# Patient Record
Sex: MALE | Race: WHITE | NOT HISPANIC OR LATINO
[De-identification: names, ages, dates, MRNs, and addresses within clinical notes are randomized per-mention and may not be internally consistent; named-entity substitution may affect disease eponyms.]

---
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
## 2022-02-05 ENCOUNTER — OFFICE VISIT (OUTPATIENT)
Dept: FAMILY MEDICINE | Facility: CLINIC | Age: 22
End: 2022-02-05
Payer: COMMERCIAL

## 2022-02-05 VITALS
TEMPERATURE: 98.2 F | WEIGHT: 172 LBS | SYSTOLIC BLOOD PRESSURE: 133 MMHG | OXYGEN SATURATION: 98 % | BODY MASS INDEX: 22.08 KG/M2 | RESPIRATION RATE: 16 BRPM | HEART RATE: 107 BPM | DIASTOLIC BLOOD PRESSURE: 78 MMHG

## 2022-02-05 DIAGNOSIS — B08.4 HAND, FOOT AND MOUTH DISEASE: Primary | ICD-10-CM

## 2022-02-05 PROCEDURE — 99213 OFFICE O/P EST LOW 20 MIN: CPT | Performed by: PHYSICIAN ASSISTANT

## 2022-02-05 NOTE — PROGRESS NOTES
Assessment & Plan:      Problem List Items Addressed This Visit     None      Visit Diagnoses     Hand, foot and mouth disease    -  Primary        Medical Decision Making  Patient presents with acute onset sores/ulcers of the gums, tongue, and throat. Symptoms appear most consistent with hand, foot and mouth disease versus herpangina. Patient is otherwise tolerating fluids appropriately and has no fevers. No signs of respiratory distress. Provided education materials and discussed expected course of illness. Continue with plenty of fluids, rest, salt water gargles, honey, and over-the-counter analgesics as needed.  Discussed signs of worsening symptoms and when to follow-up with PCP if no symptom improvement.     Subjective:      Eliud Henao is a 21 year old male here for evaluation of throat pain and throat sores. Onset of symptoms was 3 to 4 days ago. Patient also notes on and off throat discomfort and sinus congestion over the last several months. Associated symptoms include fatigue. Patient denies fevers, cough, and shortness of breath. Patient has been using over-the-counter analgesics and salt water gargles with some mild improvement of symptoms. No other rash on the hands of the feet.     The following portions of the patient's history were reviewed and updated as appropriate: allergies, current medications, and problem list.     Review of Systems  Pertinent items are noted in HPI.    Allergies  No Known Allergies    Family History   Problem Relation Age of Onset     Diabetes Sister      Insulin Resistance Brother      Hypertension Mother        Social History     Tobacco Use     Smoking status: Never Smoker     Smokeless tobacco: Never Used   Substance Use Topics     Alcohol use: Not on file        Objective:      /78   Pulse 107   Temp 98.2  F (36.8  C)   Resp 16   Wt 78 kg (172 lb)   SpO2 98%   BMI 22.08 kg/m    General appearance - alert, well appearing, and in no distress and  non-toxic  Mouth - Several nonraised ulcerations affecting the gums, tongue, and posterior oropharynx, no exudate, no tonsillar swelling, mucous membranes moist, lips normal    The use of Dragon/Finestrella dictation services was used to construct the content of this note; any grammatical errors are non-intentional. Please contact the author directly if you are in need of any clarification.

## 2022-02-05 NOTE — PATIENT INSTRUCTIONS
Patient Education     When Your Child Has Hand, Foot, and Mouth Disease   Hand, foot, and mouth disease (HFMD) is a common viral infection in children. It can cause mouth sores and a painless rash on the hands, feet, or buttocks. HFMD can be easily spread from 1 person to another. It occurs more often in children younger than 10 years old. But anyone can get it. HFMD is often mistaken for strep throat because the symptoms of both conditions are similar. HFMD can cause some discomfort, but it s not a serious problem. Most cases can easily be managed and treated at home.   What causes hand, foot, and mouth disease?  HFMD is usually caused by the coxsackievirus. It can also be caused by other viruses in the same family as coxsackievirus. Your child may have caught HFMD in 1 of these ways:     Breathing infected air. The virus can enter the air when an infected person coughs, sneezes, or talks.    Contact with contaminated items. Some things may have traces of stool from an infected person. This can occur when an infected person doesn t wash his or her hands after having a bowel movement or changing a diaper.    Contact with fluid from the blisters. The blisters are part of the rash. This type of transmission is rare.  What are the symptoms of hand, foot, and mouth disease?  Symptoms usually appear 24 to 72 hours after contact. They include:     Rash of small, red bumps or blisters on the hands, feet, or buttocks    Mouth sores that often occur on the gums, tongue, inside the cheeks, and in the back of the throat (mouth sores may not occur in some children)    Sore throat    A rash over the rest of the body    Fever    Loss of appetite    Pain when swallowing    Drooling  How is hand, foot, and mouth disease diagnosed?  HFMD is diagnosed by how the rash and mouth sores look. The healthcare provider will ask about your child s symptoms and health history. He or she will also examine your child. You will be told if any  tests are needed. These are done to rule out other infections.   How is hand, foot, and mouth disease treated?  There is no specific treatment for HFMD. But there are things you can do at home to help relieve some symptoms. The illness lasts about 7 to 10 days. Your child is no longer contagious 24 hours after the fever is gone.   Mouth pain    Give your child ibuprofen or acetaminophen to treat pain or discomfort. Or, use the medicine prescribed by the healthcare provider for pain. Talk with your child's provider about the dose and when to give the medicine (schedule). Do not give ibuprofen to a baby age 6 months or younger. Do not give aspirin to a child with a fever. This can put your child at risk of a serious illness called Reye syndrome.    Liquid antacid can be used 4 times per day. This is used to coat the mouth sores for pain relief. Talk with your child's provider about how much and when to give the medicine to your child:  ? Children over age 4 can use 1 teaspoon (5ml) as a mouth rinse after meals.  ? For children under age 4, a parent can place 1/2 teaspoon (2.5ml) in the front of the mouth after meals. Don't use regular mouth rinses. They may sting.  Diet    Follow a soft diet with plenty of fluids to prevent too much fluid loss (dehydration). If your child doesn't want to eat solid foods, it's OK for a few days, as long as he or she drinks plenty of fluids.    Give your child cool drinks and frozen treats such as sherbet. These are soothing and easier to take.    Don't give your child citrus juices such as orange juice or lemonade. Don't give your child salty or spicy foods. These may cause more pain in the mouth sores.    When to get medical care  Call the child's provider if your child has any of these:     A mouth sore that doesn t go away within  14 days    Increased mouth pain    Trouble swallowing    Neck pain    Chest pain    Trouble breathing    Weakness    Lack of energy    Signs of infection  around the rash or mouth sores, such as pus, fluid leaking, or swelling)    Signs of too much fluid loss, such as very dark or little urine, excessive thirst, dry mouth, dizziness    A fever (see Fever and children below)    A seizure  Fever and children  Use a digital thermometer to check your child s temperature. Don t use a mercury thermometer. There are different kinds and uses of digital thermometers. They include:     Rectal. For children younger than 3 years, a rectal temperature is the most accurate.    Forehead (temporal). This works for children age 3 months and older. If a child under 3 months old has signs of illness, this can be used for a first pass. The provider may want to confirm with a rectal temperature.    Ear (tympanic). Ear temperatures are accurate after 6 months of age, but not before.    Armpit (axillary). This is the least reliable but may be used for a first pass to check a child of any age with signs of illness. The provider may want to confirm with a rectal temperature.    Mouth (oral). Don t use a thermometer in your child s mouth until he or she is at least 4 years old.  Use the rectal thermometer with care. Follow the product maker s directions for correct use. Insert it gently. Label it and make sure it s not used in the mouth. It may pass on germs from the stool. If you don t feel OK using a rectal thermometer, ask the healthcare provider what type to use instead. When you talk with any healthcare provider about your child s fever, tell him or her which type you used.   Below are guidelines to know if your young child has a fever. Your child s healthcare provider may give you different numbers for your child. Follow your provider s specific instructions.   Fever readings for a baby under 3 months old:     First, ask your child s healthcare provider how you should take the temperature.    Rectal or forehead: 100.4 F (38 C) or higher    Armpit: 99 F (37.2 C) or higher  Fever readings  for a child age 3 months to 36 months (3 years):     Rectal, forehead, or ear: 102 F (38.9 C) or higher    Armpit: 101 F (38.3 C) or higher  Call the healthcare provider in these cases:     Repeated temperature of 104 F (40 C) or higher in a child of any age    Fever of 100.4 or higher in baby younger than 3 months    Fever that lasts more than 24 hours in a child under age 2  Fever that lasts for 3 days in a child age 2 or older  How can hand, foot, and mouth disease be prevented?  Follow these steps to keep your child from passing HFMD on to others:     Teach your child to wash his or her hands with soap and warm water often. Handwashing is very important before eating or handling food, after using the bathroom, and after touching the rash. A child is very contagious during the first week of the illness. He or she can still be contagious for days to weeks after the illness goes away.    Your child should stay home while he or she is sick. Ask your child's healthcare provider how long your child should avoid school, , and playing with others.    Don't let your child share cups, utensils, or napkins. Don't let them share personal items such as towels and toothbrushes.  [employer] last reviewed this educational content on 4/1/2020 2000-2021 [employer]. All rights reserved. This information is not intended as a substitute for professional medical care. Always follow your healthcare professional's instructions.

## 2022-09-11 ENCOUNTER — HEALTH MAINTENANCE LETTER (OUTPATIENT)
Age: 22
End: 2022-09-11

## 2023-01-22 ENCOUNTER — HEALTH MAINTENANCE LETTER (OUTPATIENT)
Age: 23
End: 2023-01-22

## 2024-02-24 ENCOUNTER — HEALTH MAINTENANCE LETTER (OUTPATIENT)
Age: 24
End: 2024-02-24

## 2024-04-03 ENCOUNTER — OFFICE VISIT (OUTPATIENT)
Dept: FAMILY MEDICINE | Facility: CLINIC | Age: 24
End: 2024-04-03
Payer: COMMERCIAL

## 2024-04-03 VITALS
WEIGHT: 188 LBS | HEIGHT: 74 IN | TEMPERATURE: 99.2 F | DIASTOLIC BLOOD PRESSURE: 82 MMHG | HEART RATE: 78 BPM | BODY MASS INDEX: 24.13 KG/M2 | SYSTOLIC BLOOD PRESSURE: 134 MMHG | OXYGEN SATURATION: 98 %

## 2024-04-03 DIAGNOSIS — R03.0 ELEVATED BLOOD PRESSURE READING WITHOUT DIAGNOSIS OF HYPERTENSION: ICD-10-CM

## 2024-04-03 DIAGNOSIS — B36.0 TINEA VERSICOLOR: Primary | ICD-10-CM

## 2024-04-03 PROCEDURE — 99203 OFFICE O/P NEW LOW 30 MIN: CPT | Performed by: FAMILY MEDICINE

## 2024-04-03 RX ORDER — SELENIUM SULFIDE 2.5 MG/100ML
LOTION TOPICAL
Qty: 118 ML | Refills: 3 | Status: SHIPPED | OUTPATIENT
Start: 2024-04-03

## 2024-04-03 NOTE — PROGRESS NOTES
Assessment & Plan     Tinea versicolor  Does wll with this   - selenium sulfide (SELSUN) 2.5 % external lotion; [SELENIUM SULFIDE (SELSUN) 2.5 % LOTION] Apply to rash as directed nightly for 1 week, wash off in the AM    Elevated blood pressure reading without diagnosis of hypertension  Will have him check blood pressure at home for the next 3-4 weeks . If not elevated just keep monitoring . If elevated will start hypertension work up  - Home Blood Pressure Monitor Order for DME - ONLY FOR DME              Patient Instructions   Your goal blood pressure should be < 130/80    Recheck by mychrt I 3-4 weeks     Subjective   Eliud is a 23 year old, presenting for the following health issues:  Establish Care and Hypertension        4/3/2024     9:17 AM   Additional Questions   Roomed by Clary ARNDT CMA   Accompanied by self     History of Present Illness       Reason for visit:  High blood pressure  Symptom onset:  More than a month  Symptoms include:  None  What makes it worse:  NA  What makes it better:  NA    He eats 0-1 servings of fruits and vegetables daily.He consumes 0 sweetened beverage(s) daily.He exercises with enough effort to increase his heart rate 30 to 60 minutes per day.  He exercises with enough effort to increase his heart rate 5 days per week.   He is taking medications regularly.     Dentist yesterday - 175/100, 170/85  Mother has hypertension     BP Readings from Last 6 Encounters:   04/03/24 (!) 158/88   02/05/22 133/78   05/24/21 132/68   11/09/20 110/68   09/01/20 122/78   09/19/19 (!) 150/90 (98%, Z = 2.05 /  98%, Z = 2.05)*     *BP percentiles are based on the 2017 AAP Clinical Practice Guideline for boys   He has had the elevation n the blood pressure he was at the dentist and it was the above numbers he has no other symptoms     Mom has hypertension and is on medications  He would like to take better control of th ebp   Mom is an np   He is working for [employer] in [employer]  "              Review of Systems  Constitutional, HEENT, cardiovascular, pulmonary, gi and gu systems are negative, except as otherwise noted.      Objective    BP (!) 158/88 (BP Location: Right arm, Patient Position: Chair, Cuff Size: Adult Regular)   Pulse 78   Temp 99.2  F (37.3  C) (Tympanic)   Ht 1.88 m (6' 2\")   Wt 85.3 kg (188 lb)   SpO2 98%   BMI 24.14 kg/m    Body mass index is 24.14 kg/m .  Physical Exam   GENERAL: alert and no distress  NECK: no adenopathy, no asymmetry, masses, or scars  RESP: lungs clear to auscultation - no rales, rhonchi or wheezes  CV: regular rate and rhythm, normal S1 S2, no S3 or S4, no murmur, click or rub, no peripheral edema   MS: no gross musculoskeletal defects noted, no edema            Signed Electronically by: Bianca Trevino MD    "

## 2024-05-07 ENCOUNTER — MYC MEDICAL ADVICE (OUTPATIENT)
Dept: FAMILY MEDICINE | Facility: CLINIC | Age: 24
End: 2024-05-07
Payer: COMMERCIAL

## 2024-09-07 ENCOUNTER — MYC MEDICAL ADVICE (OUTPATIENT)
Dept: FAMILY MEDICINE | Facility: CLINIC | Age: 24
End: 2024-09-07
Payer: COMMERCIAL

## 2024-09-10 ENCOUNTER — OFFICE VISIT (OUTPATIENT)
Dept: FAMILY MEDICINE | Facility: CLINIC | Age: 24
End: 2024-09-10
Payer: COMMERCIAL

## 2024-09-10 VITALS
WEIGHT: 179 LBS | SYSTOLIC BLOOD PRESSURE: 170 MMHG | OXYGEN SATURATION: 99 % | BODY MASS INDEX: 22.97 KG/M2 | HEIGHT: 74 IN | TEMPERATURE: 99.7 F | HEART RATE: 94 BPM | DIASTOLIC BLOOD PRESSURE: 110 MMHG

## 2024-09-10 DIAGNOSIS — L60.0 INGROWN TOENAIL OF RIGHT FOOT: Primary | ICD-10-CM

## 2024-09-10 DIAGNOSIS — R46.89: ICD-10-CM

## 2024-09-10 PROCEDURE — 99213 OFFICE O/P EST LOW 20 MIN: CPT | Performed by: FAMILY MEDICINE

## 2024-09-10 PROCEDURE — G2211 COMPLEX E/M VISIT ADD ON: HCPCS | Performed by: FAMILY MEDICINE

## 2024-09-10 RX ORDER — CEPHALEXIN 500 MG/1
500 CAPSULE ORAL 3 TIMES DAILY
Qty: 30 CAPSULE | Refills: 0 | Status: SHIPPED | OUTPATIENT
Start: 2024-09-10 | End: 2024-09-20

## 2024-09-10 NOTE — PROGRESS NOTES
"  Assessment & Plan     Ingrown toenail of right foot  This needs to be taken care of asap   - cephALEXin (KEFLEX) 500 MG capsule; Take 1 capsule (500 mg) by mouth 3 times daily for 10 days.  - Orthopedic  Referral; Future    Prominent glabella  He is to observe and monitor for change. I reassured him that there is not much there to cause trouble for him . He can return if this continues to enlarge.             CONSULTATION/REFERRAL to podiatry    Loulou Kline is a 24 year old, presenting for the following health issues:  Mass (Lump on head. Friday night pain in eye brow right side. Pain & pressure in the forehead/eye on both sides. Feeling out of it, foggy. ) and Nail Problem (Right big toe)        9/10/2024     1:51 PM   Additional Questions   Roomed by Clary ARNDT CMA     History of Present Illness       Reason for visit:  Bump showed up on my forehead  Symptom onset:  1-3 days ago   He is taking medications regularly.       BP Readings from Last 6 Encounters:   09/10/24 (!) 170/110   04/03/24 134/82   02/05/22 133/78   05/24/21 132/68   11/09/20 110/68   09/01/20 122/78       Showed up Friday night bump no known injury and he has some swelling and he has ssome pressure and he feels like it is now on the left no change in vision no headache has felt more foggy  fatigue and weakness   He tried to take the blood pressure at home 135/75   He has been checking at home   He is very concerned about this       Also with a very bad ingrown toenail on the right catie had this in the past                   Objective    BP (!) 170/110 (BP Location: Right arm, Patient Position: Sitting, Cuff Size: Adult Regular)   Pulse 94   Temp 99.7  F (37.6  C) (Tympanic)   Ht 1.88 m (6' 2\")   Wt 81.2 kg (179 lb)   SpO2 99%   BMI 22.98 kg/m    Body mass index is 22.98 kg/m .  Physical Exam   GENERAL: alert and no distress  EYES: Eyes grossly normal to inspection, PERRL and conjunctivae and sclerae normal  PSYCH: " mentation appears normal and anxious  Diabetic foot exam: nail exam ingrown nail at right great toe with both medial and lateral granulomatous tissue with proximal erythema             Signed Electronically by: Bianca Trevino MD

## 2024-10-03 ENCOUNTER — OFFICE VISIT (OUTPATIENT)
Dept: PODIATRY | Facility: CLINIC | Age: 24
End: 2024-10-03
Payer: COMMERCIAL

## 2024-10-03 VITALS — OXYGEN SATURATION: 100 % | HEART RATE: 80 BPM

## 2024-10-03 DIAGNOSIS — L60.0 INGROWING NAIL: Primary | ICD-10-CM

## 2024-10-03 PROBLEM — R22.0 HEAD LUMP: Status: ACTIVE | Noted: 2024-09-06

## 2024-10-03 PROCEDURE — 99243 OFF/OP CNSLTJ NEW/EST LOW 30: CPT | Mod: 25 | Performed by: PODIATRIST

## 2024-10-03 PROCEDURE — 11730 AVULSION NAIL PLATE SIMPLE 1: CPT | Mod: T5 | Performed by: PODIATRIST

## 2024-10-03 NOTE — PATIENT INSTRUCTIONS
We wish you continued good healing. If you have any questions or concerns, please do not hesitate to contact us at  [phone]    [facility] (secure e-mail communication and access to your chart) to send a message or to make an appointment.    Please remember to call and schedule a follow up appointment if one was recommended at your earliest convenience.     PODIATRY CLINIC HOURS  TELEPHONE NUMBER    Dr. Bijan PICKARDPLINDA FACFAS        Clinics:  [facility]  Tuesday 1PM-6PM  [place]  Wednesday 745AM-330PM  [place]  Monday 2nd,4th  830AM-4PM  Thursday/Friday 745AM-230PM     [facility] APPOINTMENTS  [phone]    [facility] APPOINTMENTS  [phone]          If you need a medication refill, please contact us you may need lab work and/or a follow up visit prior to your refill (i.e. Antifungal medications).  If MRI needed please call Imaging at [phone]   HOW DO I GET MY KNEE SCOOTER? Knee scooters can be picked up at ANY Medical Supply stores with your knee scooter Prescription.  OR  Bring your signed prescription to an [facility] showroom.   Set up an appointment for your custom Orthotics. Call any Orthotics locations call [phone]         INGROWN TOENAIL REMOVAL AFTERCARE     Go directly home and elevate the affected foot on one or two pillows for the remainder of the day/evening if possible. Your toe may stay numb anywhere from 2-8 hours.   Take Tylenol, ibuprofen or another anti-inflammatory as needed for pain.   That evening of the procedure,  you may remove the bandage.(you may soak it in warm soapy water ) After soaks, pat the area dry and then allow to airdry for a few minutes. Apply antibiotic ointment to the area and cover with  band-aid.  The following day. Find a shoe that is comfortable and minimizes the amount of rubbing on your toe, as this increases pain.  Dress with band-aids until no  longer draining, typically 3 days.  Watch for any signs and symptoms of infection such as: redness, red streaks going up the foot/leg, swelling, pus or foul odor. Fevers > 101   Please call with questions.    Dr. Bijan Farrell D.P.M FAC FAS

## 2024-10-03 NOTE — PROGRESS NOTES
Subjective:    Pt is seen today in consult from Dr. Trevino w/ the c/c of a painful ingrown right great nail both border.  This has been problematic for at least one year. positivehistory of drainage from the site. This is slowly getting worse.  Aggravated by activity and relieved by rest.  Has tried soaking which has not helped.   Recently placed on antibiotic.  Patient states that he picks at his nail.  Works at a job mostly sitting down.  States before recent bout with ingrown nail last time he had this was approximately 10 years ago.  Denies fever and chill, denies numbness and tingling, denies erythema on dorsum of foot.     ROS:  see above    Past Medical History:   Diagnosis Date    SVT (supraventricular tachycardia) (H)        Past Surgical History:   Procedure Laterality Date    CARDIAC ELECTROPHYSIOLOGY MAPPING AND ABLATION      RADIOFREQUENCY ABLATION      for SVT       Family History   Problem Relation Age of Onset    Diabetes Sister     Insulin Resistance Brother     Hypertension Mother        Social History     Tobacco Use    Smoking status: Never    Smokeless tobacco: Never   Substance Use Topics    Alcohol use: Not on file         Current Outpatient Medications:     IBUPROFEN ORAL, [IBUPROFEN ORAL] Take by mouth., Disp: , Rfl:     selenium sulfide (SELSUN) 2.5 % external lotion, [SELENIUM SULFIDE (SELSUN) 2.5 % LOTION] Apply to rash as directed nightly for 1 week, wash off in the AM, Disp: 118 mL, Rfl: 3     No Known Allergies    Pulse 80   SpO2 100% .      Constitutional/ general:  Pt is in no apparent distress, appears well-nourished.  Cooperative with history and physical exam.     Psych:  The patient answered questions appropriately.  Normal affect.  Seems to have reasonable expectations, in terms of treatment.     Lungs:  Non labored breathing, non labored speech. No cough.  No audible wheezing. Even, quiet breathing.       Vascular:  Pedal pulses are palpable bilaterally for both the DP and PT  arteries.  CFT < 3 sec.  No ankle varicosities or edema.  Pedal hair growth noted.     Neuro:  Alert and oriented x 3. Coordinated gait.  Light touch sensation is intact     Derm: Normal texture and turgor.  No ecchymosis, or cyanosis.  Hair growth noted.        Musculoskeletal:     Patient is ambulatory without an assistive device or brace.   right great toe nail both border shows soft tissue impingement with localized erythema.   positive active drainage/purulence at this time.  No sinus tracts.  No nailbed masses or exostosis.  No pain with range of motion of IPJ or MTPJ.  No ascending cellulitis.    ASSESSMENT:    Onychocryptosis with paronychia right toe.    Discussed etiology and treatment options in detail w/ the pt.  The potential causes and nature of an ingrown toenail were discussed with the patient.  We reviewed the natural history/prognosis of the condition and potential risks if no treatment is provided.      After thorough discussion and answering all questions, the patient elected to have nail avulsion.  Obtained consent, used 3cc of 1% lidocaine plain to block right first toe.  Sterile prep, then avulsed the affected border(s).  No evidence of deep abscess noted.  Pt tolerated procedure well.  Sterile bandage placed, gave wound care instruction.  Instructed patient on trimming nails correctly.  Patient will stop picking and nail.  They will avoid tight shoes.  Avoid pedicures.  Discussed permanent removal of border if chronic problem.  Return to clinic prn.  Thank you for allowing me participate in the care of this patient.          Bijan Farrell DPM, FACFAS

## 2024-11-10 ENCOUNTER — E-VISIT (OUTPATIENT)
Dept: FAMILY MEDICINE | Facility: CLINIC | Age: 24
End: 2024-11-10
Payer: COMMERCIAL

## 2024-11-10 DIAGNOSIS — L60.0 INGROWN TOENAIL OF RIGHT FOOT: Primary | ICD-10-CM

## 2024-11-11 NOTE — PATIENT INSTRUCTIONS
Thank you for choosing us for your care. I have placed the below referral(s) for you:  Orders Placed This Encounter   Procedures     Orthopedic  Referral     Please click the link for your After Visit Summary to view scheduling instructions for your referral. In most cases, you will be contacted within 2 business days to schedule. If you do not hear from a representative within that time, please call [phone] to be connected to a .

## 2024-11-12 ENCOUNTER — PATIENT OUTREACH (OUTPATIENT)
Dept: CARE COORDINATION | Facility: CLINIC | Age: 24
End: 2024-11-12
Payer: COMMERCIAL

## 2024-11-14 ENCOUNTER — PATIENT OUTREACH (OUTPATIENT)
Dept: CARE COORDINATION | Facility: CLINIC | Age: 24
End: 2024-11-14
Payer: COMMERCIAL

## 2025-03-09 ENCOUNTER — HEALTH MAINTENANCE LETTER (OUTPATIENT)
Age: 25
End: 2025-03-09